# Patient Record
Sex: FEMALE | URBAN - METROPOLITAN AREA
[De-identification: names, ages, dates, MRNs, and addresses within clinical notes are randomized per-mention and may not be internally consistent; named-entity substitution may affect disease eponyms.]

---

## 2020-06-16 ENCOUNTER — APPOINTMENT (RX ONLY)
Dept: URBAN - METROPOLITAN AREA CLINIC 40 | Facility: CLINIC | Age: 25
Setting detail: DERMATOLOGY
End: 2020-06-16

## 2020-06-16 ENCOUNTER — RX ONLY (OUTPATIENT)
Age: 25
Setting detail: RX ONLY
End: 2020-06-16

## 2020-06-16 DIAGNOSIS — L56.4 POLYMORPHOUS LIGHT ERUPTION: ICD-10-CM

## 2020-06-16 PROCEDURE — ? PRESCRIPTION MEDICATION MANAGEMENT

## 2020-06-16 PROCEDURE — ? COUNSELING

## 2020-06-16 PROCEDURE — 99202 OFFICE O/P NEW SF 15 MIN: CPT

## 2020-06-16 RX ORDER — TRIAMCINOLONE ACETONIDE 1 MG/G
CREAM TOPICAL
Qty: 1 | Refills: 1 | Status: ERX | COMMUNITY
Start: 2020-06-16

## 2020-06-16 ASSESSMENT — LOCATION SIMPLE DESCRIPTION DERM
LOCATION SIMPLE: LEFT THIGH
LOCATION SIMPLE: RIGHT FOREARM
LOCATION SIMPLE: RIGHT THIGH
LOCATION SIMPLE: LEFT FOREARM

## 2020-06-16 ASSESSMENT — LOCATION DETAILED DESCRIPTION DERM
LOCATION DETAILED: LEFT ANTERIOR DISTAL THIGH
LOCATION DETAILED: LEFT PROXIMAL DORSAL FOREARM
LOCATION DETAILED: RIGHT ANTERIOR DISTAL THIGH
LOCATION DETAILED: RIGHT PROXIMAL DORSAL FOREARM

## 2020-06-16 ASSESSMENT — LOCATION ZONE DERM
LOCATION ZONE: ARM
LOCATION ZONE: LEG

## 2020-06-16 NOTE — PROCEDURE: PRESCRIPTION MEDICATION MANAGEMENT
Otc Regimen: Physical/mineral sunscreen
Detail Level: Zone
Render In Strict Bullet Format?: No
Initiate Treatment: Triamcinolone 0.1% cream BID x 2 weeks then sparingly

## 2024-09-06 NOTE — ASU PATIENT PROFILE, ADULT - NSICDXPASTSURGICALHX_GEN_ALL_CORE_FT
PAST SURGICAL HISTORY:  No significant past surgical history PAST SURGICAL HISTORY:  [place] teeth extracted

## 2024-09-06 NOTE — ASU PATIENT PROFILE, ADULT - NS PREOP UNDERSTANDS INFO
No solid food/dairy/candy/gum after midnight Sunday; water/clear apple juice/black coffee/tea/Gatorade allowed before 05:30am Monday; patient reminded to come with photo ID/insurance/credit card; dress in comfortable clothes; no jewelries/contact lens/valuable; no smoking/alcohol drinking/recreational drug use Sunday; escort to have photo ID; address and callback number was given/yes

## 2024-09-06 NOTE — ASU PATIENT PROFILE, ADULT - ANESTHESIA, PREVIOUS REACTION, PROFILE
Procedure end time. Successful embolization of pelvic bleed w/surgifoam; 5 Sami femoral arterial sheath removed and closed w/angioseal, pressure held by Doctor and Technician, site free from bleeding/hematoma. Patient tolerated procedure well, denies pain/nausea. ICU RN in angio control room t/o procedure.. none

## 2024-09-09 ENCOUNTER — OUTPATIENT (OUTPATIENT)
Dept: OUTPATIENT SERVICES | Facility: HOSPITAL | Age: 29
LOS: 1 days | Discharge: HOME | End: 2024-09-09
Payer: COMMERCIAL

## 2024-09-09 VITALS
OXYGEN SATURATION: 99 % | SYSTOLIC BLOOD PRESSURE: 109 MMHG | HEART RATE: 72 BPM | DIASTOLIC BLOOD PRESSURE: 62 MMHG | TEMPERATURE: 98 F | RESPIRATION RATE: 16 BRPM

## 2024-09-09 VITALS
WEIGHT: 121.25 LBS | RESPIRATION RATE: 16 BRPM | HEART RATE: 91 BPM | HEIGHT: 63 IN | DIASTOLIC BLOOD PRESSURE: 80 MMHG | OXYGEN SATURATION: 100 % | SYSTOLIC BLOOD PRESSURE: 120 MMHG | TEMPERATURE: 97 F

## 2024-09-09 DIAGNOSIS — K08.409 PARTIAL LOSS OF TEETH, UNSPECIFIED CAUSE, UNSPECIFIED CLASS: Chronic | ICD-10-CM

## 2024-09-09 PROCEDURE — 47563 LAPARO CHOLECYSTECTOMY/GRAPH: CPT | Mod: AS

## 2024-09-09 PROCEDURE — 88304 TISSUE EXAM BY PATHOLOGIST: CPT | Mod: 26

## 2024-09-09 DEVICE — LIGATING CLIPS WECK HEMOLOK POLYMER MEDIUM-LARGE (GREEN) 6: Type: IMPLANTABLE DEVICE | Status: FUNCTIONAL

## 2024-09-09 RX ORDER — DULOXETINE HCL 30 MG
1 CAPSULE,DELAYED RELEASE (ENTERIC COATED) ORAL
Refills: 0 | DISCHARGE

## 2024-09-09 RX ORDER — CHLORHEXIDINE GLUCONATE 40 MG/ML
1 SOLUTION TOPICAL ONCE
Refills: 0 | Status: COMPLETED | OUTPATIENT
Start: 2024-09-09 | End: 2024-09-09

## 2024-09-09 RX ORDER — KETOROLAC TROMETHAMINE 30 MG/ML
1 INJECTION, SOLUTION INTRAMUSCULAR
Qty: 9 | Refills: 0
Start: 2024-09-09 | End: 2024-09-11

## 2024-09-09 RX ORDER — APREPITANT 40 MG/1
40 CAPSULE ORAL ONCE
Refills: 0 | Status: COMPLETED | OUTPATIENT
Start: 2024-09-09 | End: 2024-09-09

## 2024-09-09 RX ORDER — NALOXONE HCL 1 MG/ML
1 VIAL (ML) INJECTION
Qty: 1 | Refills: 0
Start: 2024-09-09

## 2024-09-09 RX ORDER — FENTANYL CITRATE 50 UG/ML
25 INJECTION INTRAMUSCULAR; INTRAVENOUS
Refills: 0 | Status: DISCONTINUED | OUTPATIENT
Start: 2024-09-09 | End: 2024-09-09

## 2024-09-09 RX ORDER — ACETAMINOPHEN 325 MG/1
1000 TABLET ORAL ONCE
Refills: 0 | Status: COMPLETED | OUTPATIENT
Start: 2024-09-09 | End: 2024-09-09

## 2024-09-09 RX ORDER — OXYCODONE HYDROCHLORIDE 5 MG/1
1 TABLET ORAL
Qty: 5 | Refills: 0
Start: 2024-09-09

## 2024-09-09 RX ORDER — CLONAZEPAM 1 MG
1 TABLET ORAL
Refills: 0 | DISCHARGE

## 2024-09-09 RX ADMIN — APREPITANT 40 MILLIGRAM(S): 40 CAPSULE ORAL at 08:00

## 2024-09-09 RX ADMIN — ACETAMINOPHEN 1000 MILLIGRAM(S): 325 TABLET ORAL at 08:00

## 2024-09-09 RX ADMIN — CHLORHEXIDINE GLUCONATE 1 APPLICATION(S): 40 SOLUTION TOPICAL at 08:00

## 2024-09-09 NOTE — PRE-ANESTHESIA EVALUATION ADULT - NSATTENDATTESTRD_GEN_ALL_CORE
Improved The patient has been re-examined and I agree with the above assessment or I updated with my findings.

## 2024-09-09 NOTE — BRIEF OPERATIVE NOTE - OPERATION/FINDINGS
Patient was prepped and draped in the usual sterile fashion. Time out done. Vertical periumbilical incision was made. Blunt dissection down to and through the level of the fascia. A 12mm robotic trocar was placed.  Abdomen was insufflated.  3 additional 8mm robotic trocars were placed under direct visualization. (DV). Patient placed in reverse Trendelenburg with right side up.  Robot was docked to the patient.  Gallbladder fundus retracted superiorly over dome of liver.  Gallbladder infundibulum retracted laterally towards RLQ exposing Calot’s triangle. Critical view of safety obtained. Cystic duct and artery skeletonized, clipped and divided. Hemostasis achieved. No leakage of bile from cystic duct stump. Gallbladder was removed via a 10mm endocatch. Hemostasis was again ensured. A TAP block was done. Trocars removed under DV. Fascia closed with 0 vicryl. Skin closed with 4-0 monocryl, Dermabond, and steri-strips. Patient was extubated and transported to recovery in stable condition.  See full dictation.

## 2024-09-09 NOTE — ASU DISCHARGE PLAN (ADULT/PEDIATRIC) - ASU DC SPECIAL INSTRUCTIONSFT
Render Note In Bullet Format When Appropriate: No Method: liquid nitrogen Consent: The patient's consent was obtained including but not limited to risks of crusting, scabbing, blistering, scarring, darker or lighter pigmentary change, recurrence, incomplete removal and infection. You may shower tomorrow. Do not scrub the incisions. Rinse them with soap and water and blot them dry.   Leave the steri strips on until they fall off on their own or remove them if get dirty.  Your prescriptions were sent to the [facility] on [place].  Take the Toradol every 8 hours for 3 days. Take Oxycodone as needed.  Because we have prescribed narcotics for you and you take other controlled substances the combination increases your (rare) risk for overdose, therefore we have prescribed Narcan. Use ONLY if needed.   Eat a lowfat diet for at least 6 weeks.  Follow up with Dr. Aranda in 1-2 weeks. Post-Care Instructions: I reviewed with the patient in detail post-care instructions. Patient is to wear sunprotection, and avoid picking at any of the treated lesions. Pt may apply Vaseline to crusted or scabbing areas. Anesthesia Volume In Cc: 0 Post-Procedure Care (Optional): The wound was cleaned, and a pressure dressing was applied.  The patient received detailed post-op instructions. Detail Level: Simple

## 2024-09-09 NOTE — BRIEF OPERATIVE NOTE - NSICDXBRIEFPREOP_GEN_ALL_CORE_FT
PRE-OP DIAGNOSIS:  Biliary dyskinesia 09-Sep-2024 11:56:22  Solo Grover  Calculus of gallbladder without cholecystitis without obstruction 09-Sep-2024 11:56:09  Solo Grover

## 2024-09-09 NOTE — ASU DISCHARGE PLAN (ADULT/PEDIATRIC) - CARE PROVIDER_API CALL
Bev Aranda [facility]  Surgery  [address] [place] [address], [address]  [place] [address]  Phone: [phone]  Follow Up Time:

## 2024-09-09 NOTE — ASU PREOP CHECKLIST - SELECT TESTS ORDERED
BMP/CBC/CMP/PT/PTT/INR/Hepatic Function/EKG HCG negative/BMP/CBC/CMP/PT/PTT/INR/Hepatic Function/HCG/EKG

## 2024-09-13 LAB — SURGICAL PATHOLOGY STUDY: SIGNIFICANT CHANGE UP

## 2025-01-09 NOTE — ASU PATIENT PROFILE, ADULT - PATIENT REPRESENTATIVE: ( YOU CAN CHOOSE ANY PERSON THAT CAN ASSIST YOU WITH YOUR HEALTH CARE PREFERENCES, DOES NOT HAVE TO BE A SPOUSE, IMMEDIATE FAMILY OR SIGNIFICANT OTHER/PARTNER)
[facility] Discharge Summary    Dagoberto Hickey  1958  Case # [id number]    Location: [] [place] [x] [place]    Admission Date: 9/04/24    Date of last service: 1/9/2025    Therapist: Maxwell Briggs LPC     Presenting Problem  Court/Community control referral   Last drug screen: 11/19/24 Results: Neg for drugs/alcohol, positive for     Diagnosis: F10.20 Other and unspecified alcohol dependence/unspecified drinking behavior and F14.10 Nondependent cocaine abuse-unspecified use         Service:   assessment,   individual wkly,  J-Rig individual   UDS Random    Level of care at ADMISSIONS: 1 Outpatient Services    Level of care at DISCHARGE : 1 Outpatient Services    Client's Outcomes Treatment: (Review of participation, successes, insight, etc.)   Client met goals and objectives.   Client is homeless and was placed a group home in [place].  Client would benefit from ongoing support for MH and stress.    Service History Appointments:15Scheduled 14Kept 1Cancelled     Discharge Code: A referral to higher level of care individual and MH support    Reason for discharge: moved into a group home, met objectives, would benefit from Continual MH support A    Recommendations/Referrals: Client follow up with medical providers including counseling for mental health and life stressors.    If upon involuntary termination from service, client has received Client Rights as to their right to file an appeal.    Adult/Adolescent Discharge  Level of Care Criteria to be completed separately       Maxwell Briggs LPC   1/9/2025 / 4:19 PM       Medical Director     JESSIE Bauer MD    Signature:                              Declines

## (undated) DEVICE — GLV 6 PROTEXIS (WHITE)

## (undated) DEVICE — XI OBTURATOR OPTICAL BLADELESS 8MM

## (undated) DEVICE — XI ENDOWRIST 12 - 8 MM CANNULA REDUCER

## (undated) DEVICE — MARKING PEN W RULER

## (undated) DEVICE — SUT VICRYL 0 27" UR-6

## (undated) DEVICE — XI 12MM AND STAPLER CANNULA SEAL

## (undated) DEVICE — ELCTR BOVIE PENCIL HANDPIECE ROCKER SWITCH 15FT

## (undated) DEVICE — D HELP - CLEARVIEW CLEARIFY SYSTEM

## (undated) DEVICE — GLV 7.5 PROTEXIS (WHITE)

## (undated) DEVICE — XI ENDOWRIST SUCTION IRRIGATOR 8MM

## (undated) DEVICE — XI SEAL UNIV 5- 8 MM

## (undated) DEVICE — XI ARM FORCEP FENESTRATED BIPOLAR 8MM

## (undated) DEVICE — ENDOCATCH 10MM SPECIMEN POUCH

## (undated) DEVICE — DRAPE TOP SHEET 53" X 101"

## (undated) DEVICE — XI DRAPE COLUMN

## (undated) DEVICE — ELCTR GROUNDING PAD ADULT COVIDIEN

## (undated) DEVICE — DRSG DERMABOND 0.7ML

## (undated) DEVICE — SUT MONOCRYL 4-0 27" PS-2 UNDYED

## (undated) DEVICE — GLV 6.5 PROTEXIS (WHITE)

## (undated) DEVICE — GOWN ROYAL SILK XL

## (undated) DEVICE — WARMING BLANKET LOWER ADULT

## (undated) DEVICE — XI TIP COVER

## (undated) DEVICE — TROCAR COVIDIEN VERSAONE BLUNT TIP HASSAN 12MM

## (undated) DEVICE — XI DRAPE ARM

## (undated) DEVICE — VENODYNE/SCD SLEEVE CALF MEDIUM

## (undated) DEVICE — GLV 7 PROTEXIS (WHITE)